# Patient Record
Sex: FEMALE | Race: WHITE | NOT HISPANIC OR LATINO | URBAN - METROPOLITAN AREA
[De-identification: names, ages, dates, MRNs, and addresses within clinical notes are randomized per-mention and may not be internally consistent; named-entity substitution may affect disease eponyms.]

---
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
## 2024-03-01 ENCOUNTER — OFFICE VISIT (OUTPATIENT)
Dept: URGENT CARE | Facility: CLINIC | Age: 19
End: 2024-03-01
Payer: COMMERCIAL

## 2024-03-01 VITALS
OXYGEN SATURATION: 99 % | DIASTOLIC BLOOD PRESSURE: 67 MMHG | SYSTOLIC BLOOD PRESSURE: 112 MMHG | RESPIRATION RATE: 16 BRPM | TEMPERATURE: 98.1 F | HEIGHT: 62 IN | HEART RATE: 92 BPM | WEIGHT: 130 LBS | BODY MASS INDEX: 23.92 KG/M2

## 2024-03-01 DIAGNOSIS — J02.9 SORE THROAT: ICD-10-CM

## 2024-03-01 DIAGNOSIS — J02.0 STREP PHARYNGITIS: Primary | ICD-10-CM

## 2024-03-01 LAB — S PYO AG THROAT QL: POSITIVE

## 2024-03-01 PROCEDURE — 87880 STREP A ASSAY W/OPTIC: CPT

## 2024-03-01 PROCEDURE — 99213 OFFICE O/P EST LOW 20 MIN: CPT

## 2024-03-01 RX ORDER — FLUCONAZOLE 150 MG/1
150 TABLET ORAL ONCE
Qty: 1 TABLET | Refills: 0 | Status: SHIPPED | OUTPATIENT
Start: 2024-03-01 | End: 2024-03-01

## 2024-03-01 RX ORDER — CEPHALEXIN 500 MG/1
500 CAPSULE ORAL EVERY 12 HOURS SCHEDULED
Qty: 20 CAPSULE | Refills: 0 | Status: SHIPPED | OUTPATIENT
Start: 2024-03-01 | End: 2024-03-11

## 2024-03-02 NOTE — PROGRESS NOTES
[facility]        NAME: Aga Villalba is a 18 y.o. female  : 2005    MRN: [id number]  DATE: 2024  TIME: 7:22 PM    Assessment and Plan   Strep pharyngitis [J02.0]  1. Strep pharyngitis  cephalexin (KEFLEX) 500 mg capsule    fluconazole (DIFLUCAN) 150 mg tablet      2. Sore throat  POCT rapid strepA        Rapid strep: pos    Patient Instructions     Take antibiotic as prescribed  Take Diflucan if needed  Take probiotic  Plenty of fluids  Can use honey   Cool mist humidifier   Warm gargle with salt water for sore throat   Use Tylenol/ibuprofen as needed for fever or pain    Follow up with PCP in 3-5 days.  Proceed to  ER if symptoms worsen.    Chief Complaint     Chief Complaint   Patient presents with    Sore Throat     Just got over strep after a 10 day course of cefdinir. Today sore throat got bad today         History of Present Illness     Patient is presenting for sore throat today.  She just completed a 10 day course of cefdinir due to strep and then her symptoms started again.  Sore Throat   This is a recurrent problem. The current episode started today. Pertinent negatives include no congestion, coughing, headaches, shortness of breath or trouble swallowing.       Review of Systems   Review of Systems   Constitutional:  Negative for appetite change, chills and fever.   HENT:  Positive for sore throat. Negative for congestion, postnasal drip, rhinorrhea, sinus pressure and trouble swallowing.    Respiratory:  Negative for cough, chest tightness, shortness of breath and wheezing.    Cardiovascular:  Negative for chest pain.   Skin:  Negative for color change and pallor.   Neurological:  Negative for dizziness, light-headedness and headaches.         Current Medications       Current Outpatient Medications:     cephalexin (KEFLEX) 500 mg capsule, Take 1 capsule (500 mg total) by mouth every 12 (twelve) hours for 10 days, Disp: 20 capsule, Rfl: 0    desogestrel-ethinyl estradiol  "(APRI) 0.15-30 MG-MCG per tablet, Take 1 tablet by mouth daily, Disp: , Rfl:     fluconazole (DIFLUCAN) 150 mg tablet, Take 1 tablet (150 mg total) by mouth once for 1 dose, Disp: 1 tablet, Rfl: 0    Multiple Vitamins-Minerals (multivitamin with minerals) tablet, Take 1 tablet by mouth daily, Disp: , Rfl:     Current Allergies     Allergies as of 03/01/2024 - Reviewed 03/01/2024   Allergen Reaction Noted    Amoxicillin GI Intolerance 05/08/2023            The following portions of the patient's history were reviewed and updated as appropriate: allergies, current medications, past family history, past medical history, past social history, past surgical history and problem list.     Past Medical History:   Diagnosis Date    H/O cardiac radiofrequency ablation     Brian-Parkinson-White (WPW) pattern        Past Surgical History:   Procedure Laterality Date    CARDIAC ELECTROPHYSIOLOGY STUDY AND ABLATION         Family History   Problem Relation Age of Onset    Arrhythmia Mother     No Known Problems Father          Medications have been verified.        Objective   /67   Pulse 92   Temp 98.1 °F (36.7 °C)   Resp 16   Ht 5' 2\" (1.575 m)   Wt 59 kg (130 lb)   LMP 02/23/2024   SpO2 99%   BMI 23.78 kg/m²        Physical Exam     Physical Exam  Constitutional:       General: She is not in acute distress.     Appearance: Normal appearance. She is not ill-appearing.   HENT:      Head: Normocephalic.      Right Ear: Tympanic membrane and external ear normal. No drainage or tenderness. Tympanic membrane is not erythematous.      Left Ear: Tympanic membrane and external ear normal. No drainage or tenderness. Tympanic membrane is not erythematous.      Nose: No congestion.      Mouth/Throat:      Mouth: Mucous membranes are moist.      Pharynx: Oropharynx is clear. Uvula midline. Posterior oropharyngeal erythema present. No pharyngeal swelling or oropharyngeal exudate.      Tonsils: No tonsillar exudate or tonsillar " abscesses.   Cardiovascular:      Rate and Rhythm: Normal rate and regular rhythm.      Pulses: Normal pulses.      Heart sounds: Normal heart sounds.   Pulmonary:      Effort: Pulmonary effort is normal. No respiratory distress.      Breath sounds: Normal breath sounds. No stridor. No wheezing, rhonchi or rales.   Musculoskeletal:      Cervical back: Normal range of motion. No tenderness.   Lymphadenopathy:      Cervical: Cervical adenopathy present.   Skin:     General: Skin is warm and dry.   Neurological:      General: No focal deficit present.      Mental Status: She is alert and oriented to person, place, and time. Mental status is at baseline.   Psychiatric:         Mood and Affect: Mood normal.         Behavior: Behavior normal.         Thought Content: Thought content normal.         Judgment: Judgment normal.

## 2024-03-02 NOTE — PATIENT INSTRUCTIONS
Take antibiotic as prescribed  Take Diflucan if needed  Take probiotic  Plenty of fluids  Can use honey   Cool mist humidifier   Warm gargle with salt water for sore throat   Use Tylenol/ibuprofen as needed for fever or pain    Follow up with PCP in 3-5 days.  Proceed to  ER if symptoms worsen.

## 2024-05-04 ENCOUNTER — OFFICE VISIT (OUTPATIENT)
Dept: URGENT CARE | Facility: CLINIC | Age: 19
End: 2024-05-04
Payer: COMMERCIAL

## 2024-05-04 VITALS
WEIGHT: 130 LBS | BODY MASS INDEX: 23.92 KG/M2 | DIASTOLIC BLOOD PRESSURE: 70 MMHG | HEIGHT: 62 IN | HEART RATE: 104 BPM | TEMPERATURE: 98.1 F | RESPIRATION RATE: 16 BRPM | OXYGEN SATURATION: 99 % | SYSTOLIC BLOOD PRESSURE: 112 MMHG

## 2024-05-04 DIAGNOSIS — J02.0 STREP PHARYNGITIS: Primary | ICD-10-CM

## 2024-05-04 LAB — S PYO AG THROAT QL: POSITIVE

## 2024-05-04 PROCEDURE — 99213 OFFICE O/P EST LOW 20 MIN: CPT

## 2024-05-04 PROCEDURE — 87880 STREP A ASSAY W/OPTIC: CPT

## 2024-05-04 RX ORDER — CEPHALEXIN 500 MG/1
500 CAPSULE ORAL EVERY 12 HOURS SCHEDULED
Qty: 20 CAPSULE | Refills: 0 | Status: SHIPPED | OUTPATIENT
Start: 2024-05-04 | End: 2024-05-14

## 2024-05-04 NOTE — PROGRESS NOTES
[facility]        NAME: Aga Villalba is a 18 y.o. female  : 2005    MRN: [id number]  DATE: May 4, 2024  TIME: 10:08 AM    Assessment and Plan   Strep pharyngitis [J02.0]  1. Strep pharyngitis  POCT rapid strepA    cephalexin (KEFLEX) 500 mg capsule        Rapid strep positive  Will treat with cephalexin. Pt has tolerated this well in the past.     Patient Instructions   Take antibiotics as directed. Take entire duration, do not stop antibiotic just because your symptoms have resolved. Avoid milk products, eat softer foods. Warm salt water rinses. Honey with hot tea. Cool or warm fluid may be soothing. Avoid sharing drinks/utensils. Proper hand hygiene. Dispose of toothbrush after 48 hours of antibiotics. No school for 24 hours. Treat fever with alternating tylenol and motrin. If symptoms worsen proceed to ED. Follow with PCP    Follow up with PCP in 3-5 days.  Proceed to  ER if symptoms worsen.    Chief Complaint     Chief Complaint   Patient presents with    Sore Throat     C/o sore throat associated with nasal congestion, onset last PM.          History of Present Illness       Patient is a 18 year old female who presents to the office today for sore throat that started last night. Notes that  3 weeks ago she was given and abx for sinusitis. Notes that she also recently had strep. Also has hx of cdiff. Does still have congestion. Is not taking     Sore Throat   Associated symptoms include congestion. Pertinent negatives include no coughing or shortness of breath.       Review of Systems   Review of Systems   Constitutional:  Positive for chills and diaphoresis. Negative for fever.   HENT:  Positive for congestion and sore throat.    Respiratory:  Negative for cough, shortness of breath and wheezing.    Cardiovascular:  Negative for chest pain and palpitations.   All other systems reviewed and are negative.        Current Medications       Current Outpatient Medications:     cephalexin (KEFLEX)  "500 mg capsule, Take 1 capsule (500 mg total) by mouth every 12 (twelve) hours for 10 days, Disp: 20 capsule, Rfl: 0    desogestrel-ethinyl estradiol (APRI) 0.15-30 MG-MCG per tablet, Take 1 tablet by mouth daily, Disp: , Rfl:     Multiple Vitamin (MULTIVITAMIN ADULT PO), Take 1 capsule by mouth daily, Disp: , Rfl:     Multiple Vitamins-Minerals (multivitamin with minerals) tablet, Take 1 tablet by mouth daily, Disp: , Rfl:     Current Allergies     Allergies as of 05/04/2024 - Reviewed 05/04/2024   Allergen Reaction Noted    Amoxicillin GI Intolerance 05/08/2023            The following portions of the patient's history were reviewed and updated as appropriate: allergies, current medications, past family history, past medical history, past social history, past surgical history and problem list.     Past Medical History:   Diagnosis Date    H/O cardiac radiofrequency ablation     Brian-Parkinson-White (WPW) pattern        Past Surgical History:   Procedure Laterality Date    CARDIAC ELECTROPHYSIOLOGY STUDY AND ABLATION         Family History   Problem Relation Age of Onset    Arrhythmia Mother     No Known Problems Father          Medications have been verified.        Objective   /70   Pulse 104   Temp 98.1 °F (36.7 °C)   Resp 16   Ht 5' 2\" (1.575 m)   Wt 59 kg (130 lb)   LMP 04/20/2024 (Approximate)   SpO2 99%   BMI 23.78 kg/m²        Physical Exam     Physical Exam  Vitals and nursing note reviewed.   Constitutional:       General: She is not in acute distress.     Appearance: She is well-developed and normal weight. She is not ill-appearing or toxic-appearing.   HENT:      Right Ear: Tympanic membrane normal.      Left Ear: Tympanic membrane normal.      Mouth/Throat:      Mouth: Mucous membranes are moist.      Pharynx: Posterior oropharyngeal erythema present.      Tonsils: 1+ on the right. 1+ on the left.   Cardiovascular:      Rate and Rhythm: Normal rate and regular rhythm.      Heart sounds: " Normal heart sounds.   Pulmonary:      Effort: Pulmonary effort is normal.      Breath sounds: Normal breath sounds.   Abdominal:      Palpations: Abdomen is soft.   Lymphadenopathy:      Cervical: Cervical adenopathy present.   Skin:     General: Skin is warm.      Capillary Refill: Capillary refill takes less than 2 seconds.   Neurological:      Mental Status: She is alert.

## 2025-06-24 ENCOUNTER — TELEPHONE (OUTPATIENT)
Dept: NON INVASIVE DIAGNOSTICS | Facility: CLINIC | Age: 20
End: 2025-06-24

## 2025-06-24 DIAGNOSIS — I48.91 ATRIAL FIBRILLATION, UNSPECIFIED TYPE (HCC): ICD-10-CM

## 2025-06-24 DIAGNOSIS — I45.81 LONG Q-T SYNDROME: ICD-10-CM

## 2025-06-24 DIAGNOSIS — R94.31 ABNORMAL ELECTROCARDIOGRAM (ECG) (EKG): ICD-10-CM

## 2025-06-24 DIAGNOSIS — R00.2 PALPITATION: Primary | ICD-10-CM

## 2025-06-24 NOTE — TELEPHONE ENCOUNTER
Patients mom called concerned about daughter in [place]. Daughter is reporting she is experiencing the cramping in her chest again similar to the events she had 2 years ago. The events are occurring 1-2x/day lasting about 30 sec-2 minutes at a time.    Patient had an ablation 1/2022 at [facility] and f/u with Dr. Saunders 2/27/2023 for a second opinion.     Daughter returns from [facility] on 7/12/2025 and then leaves back to college end of August.     Recommend in the meantime hydration, frequent rest periods, avoid ETOH. Daughter is planning a 2 day hike of [place] mother wants to know if that is ok to still do; please advise and any other recommendation while in [place] or upon her return.

## 2025-06-24 NOTE — TELEPHONE ENCOUNTER
Spoke to mother Dilia informed her we would order a 2 week Zio monitor. I will place order to be shipped to home for when daughter arrives from [facility]. She is aware to share recommendations regarding hike. Provided direct number to office for any further questions or concerns. Mother was pleased with plan.

## 2025-08-04 ENCOUNTER — TELEPHONE (OUTPATIENT)
Dept: CARDIOLOGY CLINIC | Facility: CLINIC | Age: 20
End: 2025-08-04